# Patient Record
Sex: MALE | Race: WHITE | Employment: STUDENT | URBAN - METROPOLITAN AREA
[De-identification: names, ages, dates, MRNs, and addresses within clinical notes are randomized per-mention and may not be internally consistent; named-entity substitution may affect disease eponyms.]

---

## 2022-02-10 ENCOUNTER — APPOINTMENT (OUTPATIENT)
Dept: GENERAL RADIOLOGY | Age: 22
End: 2022-02-10
Payer: MEDICAID

## 2022-02-10 ENCOUNTER — APPOINTMENT (OUTPATIENT)
Dept: CT IMAGING | Age: 22
End: 2022-02-10
Payer: MEDICAID

## 2022-02-10 ENCOUNTER — HOSPITAL ENCOUNTER (EMERGENCY)
Age: 22
Discharge: HOME | End: 2022-02-10
Attending: EMERGENCY MEDICINE
Payer: MEDICAID

## 2022-02-10 VITALS
HEIGHT: 75 IN | DIASTOLIC BLOOD PRESSURE: 80 MMHG | HEART RATE: 68 BPM | OXYGEN SATURATION: 98 % | SYSTOLIC BLOOD PRESSURE: 126 MMHG | RESPIRATION RATE: 13 BRPM | WEIGHT: 175 LBS | TEMPERATURE: 98.3 F | BODY MASS INDEX: 21.76 KG/M2

## 2022-02-10 DIAGNOSIS — S06.0X1A CONCUSSION WITH BRIEF LOC: Primary | ICD-10-CM

## 2022-02-10 DIAGNOSIS — D67: ICD-10-CM

## 2022-02-10 LAB
ABO/RH: NORMAL
ACETAMINOPHEN LEVEL: <5 MCG/ML (ref 10–30)
ALBUMIN SERPL-MCNC: 4.9 G/DL (ref 3.5–5.2)
ALP BLD-CCNC: 59 U/L (ref 40–129)
ALT SERPL-CCNC: 14 U/L (ref 0–40)
ANGLE (CLOT STRENGTH): 56 DEGREE (ref 59–74)
ANION GAP SERPL CALCULATED.3IONS-SCNC: 14 MMOL/L (ref 7–16)
ANTIBODY SCREEN: NORMAL
APTT: 44.3 SEC (ref 24.5–35.1)
AST SERPL-CCNC: 21 U/L (ref 0–39)
B.E.: -2.9 MMOL/L (ref -3–3)
BILIRUB SERPL-MCNC: 0.6 MG/DL (ref 0–1.2)
BUN BLDV-MCNC: 24 MG/DL (ref 6–20)
CALCIUM SERPL-MCNC: 9.5 MG/DL (ref 8.6–10.2)
CHLORIDE BLD-SCNC: 103 MMOL/L (ref 98–107)
CO2: 24 MMOL/L (ref 22–29)
COHB: 0.4 % (ref 0–1.5)
COMMENT: ABNORMAL
CREAT SERPL-MCNC: 1.2 MG/DL (ref 0.7–1.2)
CRITICAL: ABNORMAL
DATE ANALYZED: ABNORMAL
DATE OF COLLECTION: ABNORMAL
EPL-TEG: 0 % (ref 0–15)
ETHANOL: <10 MG/DL (ref 0–0.08)
G-TEG: 6.8 K D/SC (ref 4.5–11)
GFR AFRICAN AMERICAN: >60
GFR NON-AFRICAN AMERICAN: >60 ML/MIN/1.73
GLUCOSE BLD-MCNC: 121 MG/DL (ref 74–99)
HCO3: 21 MMOL/L (ref 22–26)
HCT VFR BLD CALC: 41.1 % (ref 37–54)
HEMOGLOBIN: 14.9 G/DL (ref 12.5–16.5)
HHB: 0.5 % (ref 0–5)
INR BLD: 1.2
K (CLOTTING TIME): 2.7 MIN (ref 1–3)
LAB: ABNORMAL
LACTIC ACID: 4 MMOL/L (ref 0.5–2.2)
LY30 (FIBRINOLYSIS): 0 % (ref 0–8)
Lab: ABNORMAL
MA (MAX AMPLITUDE): 57.6 MM (ref 50–70)
MCH RBC QN AUTO: 31.4 PG (ref 26–35)
MCHC RBC AUTO-ENTMCNC: 36.3 % (ref 32–34.5)
MCV RBC AUTO: 86.7 FL (ref 80–99.9)
METHB: 0.4 % (ref 0–1.5)
MODE: ABNORMAL
O2 CONTENT: 20.8 ML/DL
O2 SATURATION: 99.5 % (ref 92–98.5)
O2HB: 98.7 % (ref 94–97)
OPERATOR ID: 2863
PATIENT TEMP: 37 C
PCO2: 33.9 MMHG (ref 35–45)
PDW BLD-RTO: 13.2 FL (ref 11.5–15)
PH BLOOD GAS: 7.41 (ref 7.35–7.45)
PLATELET # BLD: 288 E9/L (ref 130–450)
PMV BLD AUTO: 9.5 FL (ref 7–12)
PO2: 286.7 MMHG (ref 75–100)
POTASSIUM SERPL-SCNC: 4.08 MMOL/L (ref 3.5–5)
POTASSIUM SERPL-SCNC: 4.7 MMOL/L (ref 3.5–5)
PROTHROMBIN TIME: 13.2 SEC (ref 9.3–12.4)
R (REACTION TIME): 9.8 MIN (ref 5–10)
RBC # BLD: 4.74 E12/L (ref 3.8–5.8)
SALICYLATE, SERUM: <0.3 MG/DL (ref 0–30)
SODIUM BLD-SCNC: 141 MMOL/L (ref 132–146)
SOURCE, BLOOD GAS: ABNORMAL
THB: 14.5 G/DL (ref 11.5–16.5)
TIME ANALYZED: 1402
TOTAL PROTEIN: 7.4 G/DL (ref 6.4–8.3)
TRICYCLIC ANTIDEPRESSANTS SCREEN SERUM: NEGATIVE NG/ML
WBC # BLD: 7 E9/L (ref 4.5–11.5)

## 2022-02-10 PROCEDURE — 85384 FIBRINOGEN ACTIVITY: CPT

## 2022-02-10 PROCEDURE — 6810039000 HC L1 TRAUMA ALERT

## 2022-02-10 PROCEDURE — G0390 TRAUMA RESPONS W/HOSP CRITI: HCPCS

## 2022-02-10 PROCEDURE — 74177 CT ABD & PELVIS W/CONTRAST: CPT

## 2022-02-10 PROCEDURE — 85027 COMPLETE CBC AUTOMATED: CPT

## 2022-02-10 PROCEDURE — 80307 DRUG TEST PRSMV CHEM ANLYZR: CPT

## 2022-02-10 PROCEDURE — 99284 EMERGENCY DEPT VISIT MOD MDM: CPT | Performed by: SURGERY

## 2022-02-10 PROCEDURE — 85576 BLOOD PLATELET AGGREGATION: CPT

## 2022-02-10 PROCEDURE — 86850 RBC ANTIBODY SCREEN: CPT

## 2022-02-10 PROCEDURE — 85347 COAGULATION TIME ACTIVATED: CPT

## 2022-02-10 PROCEDURE — 70450 CT HEAD/BRAIN W/O DYE: CPT

## 2022-02-10 PROCEDURE — 82077 ASSAY SPEC XCP UR&BREATH IA: CPT

## 2022-02-10 PROCEDURE — 85730 THROMBOPLASTIN TIME PARTIAL: CPT

## 2022-02-10 PROCEDURE — 99291 CRITICAL CARE FIRST HOUR: CPT

## 2022-02-10 PROCEDURE — 80179 DRUG ASSAY SALICYLATE: CPT

## 2022-02-10 PROCEDURE — 86900 BLOOD TYPING SEROLOGIC ABO: CPT

## 2022-02-10 PROCEDURE — 72125 CT NECK SPINE W/O DYE: CPT

## 2022-02-10 PROCEDURE — 99285 EMERGENCY DEPT VISIT HI MDM: CPT

## 2022-02-10 PROCEDURE — 84132 ASSAY OF SERUM POTASSIUM: CPT

## 2022-02-10 PROCEDURE — 6360000004 HC RX CONTRAST MEDICATION: Performed by: RADIOLOGY

## 2022-02-10 PROCEDURE — 96374 THER/PROPH/DIAG INJ IV PUSH: CPT

## 2022-02-10 PROCEDURE — 6360000002 HC RX W HCPCS: Performed by: STUDENT IN AN ORGANIZED HEALTH CARE EDUCATION/TRAINING PROGRAM

## 2022-02-10 PROCEDURE — 71260 CT THORAX DX C+: CPT

## 2022-02-10 PROCEDURE — 80143 DRUG ASSAY ACETAMINOPHEN: CPT

## 2022-02-10 PROCEDURE — 71045 X-RAY EXAM CHEST 1 VIEW: CPT

## 2022-02-10 PROCEDURE — 86901 BLOOD TYPING SEROLOGIC RH(D): CPT

## 2022-02-10 PROCEDURE — 82805 BLOOD GASES W/O2 SATURATION: CPT

## 2022-02-10 PROCEDURE — 85610 PROTHROMBIN TIME: CPT

## 2022-02-10 PROCEDURE — 72170 X-RAY EXAM OF PELVIS: CPT

## 2022-02-10 PROCEDURE — 83605 ASSAY OF LACTIC ACID: CPT

## 2022-02-10 PROCEDURE — 80053 COMPREHEN METABOLIC PANEL: CPT

## 2022-02-10 RX ORDER — LEVETIRACETAM 10 MG/ML
1000 INJECTION INTRAVASCULAR ONCE
Status: COMPLETED | OUTPATIENT
Start: 2022-02-10 | End: 2022-02-10

## 2022-02-10 RX ORDER — TETANUS AND DIPHTHERIA TOXOIDS ADSORBED 2; 2 [LF]/.5ML; [LF]/.5ML
0.5 INJECTION INTRAMUSCULAR ONCE
Status: DISCONTINUED | OUTPATIENT
Start: 2022-02-10 | End: 2022-02-10 | Stop reason: HOSPADM

## 2022-02-10 RX ADMIN — LEVETIRACETAM 1000 MG: 1000 INJECTION, SOLUTION INTRAVENOUS at 15:30

## 2022-02-10 RX ADMIN — IOPAMIDOL 90 ML: 755 INJECTION, SOLUTION INTRAVENOUS at 14:27

## 2022-02-10 NOTE — ED NOTES
Airway intact, gcs 15 for stat      No injuries other than head/facial abrasions, unknown loc, had previous trauma approx 1 year ago and had seizure. j    Last po intake was breakfast        Etienne Peters RN  02/10/22 [id number]

## 2022-02-10 NOTE — H&P
TRAUMA HISTORY & PHYSICAL  Surgical Resident/Advance Practice Nurse  2/10/2022  1:56 PM    PRIMARY SURVEY    CHIEF COMPLAINT:  Trauma alert. Injury occurred just prior to arrival. Patient had a mechanical fall from standing. He states that he slipped at work. He had loss of consciousness. He denies pain currently. He has some superficial abrasions to knuckles and left eyebrow/forehead. He is GCS 15 and neurologically intact. He is not on any anticoagulation. Patient has history of hemophilia [id number]. He reports that he has had a seizure in the past, but he has not followed with anyone and does not take any medications. AIRWAY:   Airway Normal  EMS ETT Absent  Noisy respirations Absent  Retractions: Absent  Vomiting/bleeding: Absent      BREATHING:    Midaxillary breath sound left:  Normal  Midaxillary breath sound right:  Normal    Cough sound intensity:  good   FiO2:  15 L non-re breather mask     mL. CIRCULATION:   Femerol pulse intensity: Strong  Palpebral conjunctiva: Red    There were no vitals filed for this visit. There were no vitals filed for this visit.      FAST EXAM: Deferred    Central Nervous System    GCS Initial 15 minutes   Eye  Motor  Verbal 4 - Opens eyes on own  6 - Follows simple motor commands  5 - Alert and oriented 4 - Opens eyes on own  6 - Follows simple motor commands  5 - Alert and oriented     Neuromuscular blockade: No  Pupil size:  Left 4 mm    Right 4 mm  Pupil reaction: Yes    Wiggles fingers: Left Yes Right Yes  Wiggles toes: Left Yes   Right Yes    Hand grasp:   Left  Present      Right  Present  Plantar flexion: Left  Present      Right   Present    Loss of consciousness:  Yes    History Obtained From:  Patient & EMS  Private Medical Doctor: unknown    Pre-exisiting Medical History:  yes    Conditions: hemophillia    Medications: none    Allergies: none    Social History:   Tobacco use:  none  Alcohol use:  none  Illicit drug use:  no history of illicit drug use    Past Surgical History:  unknown    Anticoagulant use: None  Antiplatelet use:   None    NSAID use in last 72 hours: no  Taken PCN in past:  no  Last food/drink: this morning  Last tetanus: ordered in the ed    Family History:   No family history of anesthesia complications    Complaints:   Head:  None  Neck:   None  Chest:   None  Back:   None  Abdomen:   None  Extremities:   None  Comments: none    Review of systems:  All negative unless otherwise noted. SECONDARY SURVEY  Head/scalp: Atraumatic    Face: superficial skin tear on left eyebrow/forehead    Eyes/ears/nose: Atraumatic    Pharynx/mouth: Atraumatic    Neck: Atraumatic     Cervical spine tenderness:   Cervical collar in place at time of arrival  Pain:  none  ROM:  Not indicated     Chest wall:  Atraumatic    Heart:  Regular rate & rhythm    Abdomen: Atraumatic. Soft ND  Tenderness:  none    Pelvis: Atraumatic  Tenderness: none    Thoracolumbar spine: Atraumatic  Tenderness:  none    Genitourinary:  Atraumatic. No blood or urine noted    Rectum: Atraumatic. No blood noted. Perineum: Atraumatic. No blood or urine noted. Extremities:   Sensory normal  Motor normal    Distal Pulses  Left arm normal  Right arm normal  Left leg normal  Right leg normal    Capillary refill  Left arm normal  Right arm normal  Left leg normal  Right leg normal    Procedures in ED:  Femoral arterial puncture    In the event of Emergency Blood Transfusion:  Due to the critical condition of this patient, I request the immediate release of blood products for emergency transfusion secondary to shock. I understand the increased risks incurred by the lack of complete transfusion testing. Radiology: Chest Xray, Pelvic Xray, Ct head, Ct cervical spine, CT chest, CT abdomen    Consultations:  none    Admission/Diagnosis: mechanical fall from standing.  +LOC    Plan of Treatment:  Tert  Scans  Labs  dispo  Pain/nausea control    Plan discussed with Dr. Darrick Bustamante. Nicolas Mirza    at 2/10/2022 on 1:56 PM    Electronically signed by Cande Wu DO on 2/10/2022 at 1:56 PM

## 2022-02-10 NOTE — PROGRESS NOTES
Discussion held with Dr. Claire Huynh, the patient's hematologist from [facility]. She states that the patient needs to have 120 units/kg of Benefix given here immediately. She is willing to accept the patient as a transfer to [facility]. Transfer will be initiated.     Electronically signed by Jose L Byrd DO on 2/10/2022 at 3:15 PM

## 2022-02-10 NOTE — ED NOTES
C-collar  In place, moved to trauma cot maintaining spinal precautions      Kevin Jurado, RN  02/10/22 0237

## 2022-02-10 NOTE — ED NOTES
Patient packaged for ct with cardiac monitoring, rn and trauma residents      Yuly Cárdenas RN  02/10/22 [id number]

## 2022-02-10 NOTE — CARE COORDINATION
Social Work /Transition of Care:    Pt presented to the ED as a trauma alert via [facility] [facility] secondary to a fall hitting his head. Per report, pt has hx of hemophilia and seizures. SW spoke to pt's parents over the phone. Pt's dad reports pt was at a job site in [facility] when he fell. Pt is self employed as a bridget's assistant. Pt and parents live in the same home. Pt is normally independent with all ADLs and drives. Pt treats with Dr Xiao Drake, hematologist at [facility] in [place]. Pt's parents report they are on their way to the ED. SW to follow.

## 2022-02-10 NOTE — ED NOTES
Bed: 01  Expected date: 2/10/22  Expected time:   Means of arrival: Medical Flight ([employer])  Comments:  STAT     Keily Duran RN  02/10/22 [id number]

## 2022-02-10 NOTE — ED PROVIDER NOTES
Department of Emergency Medicine   ED  Provider Note  Admit Date/RoomTime: 2/10/2022  1:55 PM  ED Room: 01/01                  HPI:  2/10/22, Time: 2:26 PM MARYANNE Durán is a 23 y.o. male presenting to the ED as a trauma alert. Patient sustained a fall on ice while at work just prior to arrival.  He does have history of hemophilia and sees a pediatric hematologist at [facility].  He did have loss of consciousness and is amnestic to the event. It was unwitnessed. Patient reports constant sharp head pain. Please note, this patient arrived as a Trauma alert and the trauma service assumed the care of this patient on their arrival    Initial evaluation occurred with trauma services at bedside. This patients disposition will be determined by trauma services. Glascow Coma Scale at time of initial examination  Best Eye Response 4 - Opens eyes on own   Best Verbal Response 5 - Alert and oriented   Best Motor Response 6 - Follows simple motor commands   Total 15       Review of Systems:   A complete review of systems was performed and pertinent positives and negatives are stated within HPI, all other systems reviewed and are negative.              --------------------------------------------- PAST HISTORY ---------------------------------------------  Past Medical History:  has no past medical history on file. Past Surgical History:  has no past surgical history on file. Social History:      Family History: family history is not on file. Unless otherwise noted, family history is non contributory    The patients home medications have been reviewed. Allergies: Patient has no known allergies. ------------------------- NURSING NOTES AND VITALS REVIEWED ---------------------------   The nursing notes within the ED encounter and vital signs as below have been reviewed.    /80   Pulse 68   Temp 98.3 °F (36.8 °C)   Resp 13   Ht 6' 3\" (1.905 m)   Wt 175 lb (79.4 kg)   SpO2 98%   BMI 21.87 kg/m²   Oxygen Saturation Interpretation: Normal    The patients available past medical records and past encounters were reviewed. -------------------------------------------------- RESULTS -------------------------------------------------  All laboratory and radiology tests have been reviewed by myself  LABS:  Results for orders placed or performed during the hospital encounter of 02/10/22   Blood Gas, Arterial   Result Value Ref Range    Date Analyzed 20220210     Time Analyzed 1402     Source: Blood Arterial     pH, Blood Gas 7.409 7.350 - 7.450    PCO2 33.9 (L) 35.0 - 45.0 mmHg    PO2 286.7 (H) 75.0 - 100.0 mmHg    HCO3 21.0 (L) 22.0 - 26.0 mmol/L    B.E. -2.9 -3.0 - 3.0 mmol/L    O2 Sat 99.5 (H) 92.0 - 98.5 %    O2Hb 98.7 (H) 94.0 - 97.0 %    COHb 0.4 0.0 - 1.5 %    MetHb 0.4 0.0 - 1.5 %    O2 Content 20.8 mL/dL    HHb 0.5 0.0 - 5.0 %    tHb (est) 14.5 11.5 - 16.5 g/dL    Potassium 4.08 3.50 - 5.00 mmol/L    Mode NRB 15L     Comment Trauma     Date Of Collection      Time Collected      Pt Temp 37.0 C     ID [id number]     Lab [id number]     Critical(s) Notified .  No Critical Values    Comprehensive Metabolic Panel   Result Value Ref Range    Sodium 141 132 - 146 mmol/L    Potassium 4.7 3.5 - 5.0 mmol/L    Chloride 103 98 - 107 mmol/L    CO2 24 22 - 29 mmol/L    Anion Gap 14 7 - 16 mmol/L    Glucose 121 (H) 74 - 99 mg/dL    BUN 24 (H) 6 - 20 mg/dL    CREATININE 1.2 0.7 - 1.2 mg/dL    GFR Non-African American >60 >=60 mL/min/1.73    GFR African American >60     Calcium 9.5 8.6 - 10.2 mg/dL    Total Protein 7.4 6.4 - 8.3 g/dL    Albumin 4.9 3.5 - 5.2 g/dL    Total Bilirubin 0.6 0.0 - 1.2 mg/dL    Alkaline Phosphatase 59 40 - 129 U/L    ALT 14 0 - 40 U/L    AST 21 0 - 39 U/L   CBC   Result Value Ref Range    WBC 7.0 4.5 - 11.5 E9/L    RBC 4.74 3.80 - 5.80 E12/L    Hemoglobin 14.9 12.5 - 16.5 g/dL    Hematocrit 41.1 37.0 - 54.0 %    MCV 86.7 80.0 - 99.9 fL    MCH 31.4 26.0 - 35.0 pg    MCHC 36.3 (H) 32.0 - 34.5 %    RDW 13.2 11.5 - 15.0 fL    Platelets 269 178 - 745 E9/L    MPV 9.5 7.0 - 12.0 fL   Protime-INR   Result Value Ref Range    Protime 13.2 (H) 9.3 - 12.4 sec    INR 1.2    APTT   Result Value Ref Range    aPTT 44.3 (H) 24.5 - 35.1 sec   Lactic Acid, Plasma   Result Value Ref Range    Lactic Acid 4.0 (HH) 0.5 - 2.2 mmol/L   TEG lab test   Result Value Ref Range    R (Reaction Time) 9.8 5.0 - 10.0 min    K (Clotting Time) 2.7 1.0 - 3.0 min    Angle (Clot Strength) 56.0 (L) 59.0 - 74.0 degree    MA (Max Amplitude) 57.6 50.0 - 70.0 mm    G-TEG 6.8 4.5 - 11.0 K d/sc    EPL-TEG 0.0 0.0 - 15.0 %    LY30 (Fibrinolysis) 0.0 0.0 - 8.0 %   Serum Drug Screen   Result Value Ref Range    Ethanol Lvl <10 mg/dL    Acetaminophen Level <5.0 (L) 10.0 - 91.2 mcg/mL    Salicylate, Serum <0.8 0.0 - 30.0 mg/dL    TCA Scrn NEGATIVE Cutoff:300 ng/mL   TYPE AND SCREEN   Result Value Ref Range    ABO/Rh B POS     Antibody Screen NEG        RADIOLOGY:  Interpreted by Radiologist.  CT CERVICAL SPINE WO CONTRAST   Final Result   No acute osseous findings of the cervical spine. No acute traumatic findings of the abdomen or pelvis. CT HEAD WO CONTRAST   Final Result   No acute intracranial abnormality. CT ABDOMEN PELVIS W IV CONTRAST Additional Contrast? None   Final Result   No acute osseous findings of the cervical spine. No acute traumatic findings of the abdomen or pelvis. CT CHEST W CONTRAST   Final Result   No active cardiopulmonary disease. XR PELVIS (1-2 VIEWS)   Final Result   No fracture or dislocation. XR CHEST PORTABLE   Final Result   Ill-defined right upper lung field haziness, which could be technical in   nature. An infiltrate or, given the history of trauma, a lung contusion   cannot be excluded. Repeat radiograph or chest CT can be performed.                  ---------------------------------------------------PHYSICAL EXAM--------------------------------------      Primary Survey:  Airway: patient, trachea midline,   Breathing: Spontaneous, breath sounds equal bilaterally, symmetric chest rise  Circulation: 2+ femoral pulses, 2+ DP/PT pulses  Disability: GCS 15      Constitutional/General: Alert and oriented x3  Head: Normocephalic  Eyes: PERRL, EOMI, globes intact, no hyphema, no evidence of entrapment, conjunctiva pink  ENT: Oropharynx clear, handling secretions, no trismus. No dental trauma, no oral trauma  Neck: Immobilized in cervical collar. No crepitus, no lacerations, abrasions, deformities, or stepoffs. Back: No midline cervical spine tenderness. No thoracic spine tenderness. No lumbar spine tenderness. No Stepoffs, abrasions, lacerations, or deformities. Pulmonary: Lungs clear to auscultation bilaterally, no wheezes, rales, or rhonchi. Not in respiratory distress  Cardiovascular:  Regular rate and rhythm, no murmurs, gallops, or rubs. 2+ distal pulses  Chest: no chest wall tenderness, no crepitus  Abdomen: Soft, non tender, non distended, +BS, no rebound, guarding, or rigidity. No pulsatile masses appreciated  Extremities: Moves all extremities x 4. Warm and well perfused, no clubbing, cyanosis, or edema. Capillary refill <3 seconds  Skin: warm and dry without rash  Neurologic: GCS 15, CN 2-12 grossly intact, no focal deficits, symmetric strength 5/5 in the upper and lower extremities bilaterally  Psych: Normal Affect    Trauma Evaluation/Survey Conducted in accordance with ATLS Guidelines      ------------------------------ ED COURSE/MEDICAL DECISION MAKING----------------------  Medications   iopamidol (ISOVUE-370) 76 % injection 90 mL (90 mLs IntraVENous Given 2/10/22 1197)   levETIRAcetam (KEPPRA) 1000 mg/100 mL IVPB (0 mg IntraVENous Stopped 2/10/22 1011)         Medical Decision Making:    Patient presents to the ED for evaluation after an unwitnessed ground-level fall.   He did strike his head and had positive

## 2024-06-18 ENCOUNTER — APPOINTMENT (OUTPATIENT)
Dept: HEMATOLOGY/ONCOLOGY | Facility: HOSPITAL | Age: 24
End: 2024-06-18

## 2024-06-25 ENCOUNTER — APPOINTMENT (OUTPATIENT)
Dept: HEMATOLOGY/ONCOLOGY | Facility: HOSPITAL | Age: 24
End: 2024-06-25
Payer: MEDICAID

## 2024-06-25 ENCOUNTER — DOCUMENTATION (OUTPATIENT)
Dept: HEMATOLOGY/ONCOLOGY | Facility: HOSPITAL | Age: 24
End: 2024-06-25

## 2024-06-25 ENCOUNTER — OFFICE VISIT (OUTPATIENT)
Dept: HEMATOLOGY/ONCOLOGY | Facility: HOSPITAL | Age: 24
End: 2024-06-25
Payer: MEDICAID

## 2024-06-25 VITALS
OXYGEN SATURATION: 97 % | BODY MASS INDEX: 22.12 KG/M2 | WEIGHT: 177.91 LBS | SYSTOLIC BLOOD PRESSURE: 127 MMHG | TEMPERATURE: 98.2 F | RESPIRATION RATE: 18 BRPM | HEART RATE: 89 BPM | HEIGHT: 75 IN | DIASTOLIC BLOOD PRESSURE: 77 MMHG

## 2024-06-25 DIAGNOSIS — D66 HEMOPHILIA A (MULTI): Primary | ICD-10-CM

## 2024-06-25 DIAGNOSIS — D67 HEMOPHILIA B (MULTI): ICD-10-CM

## 2024-06-25 PROCEDURE — 99214 OFFICE O/P EST MOD 30 MIN: CPT | Performed by: INTERNAL MEDICINE

## 2024-06-25 ASSESSMENT — PAIN SCALES - GENERAL: PAINLEVEL: 0-NO PAIN

## 2024-06-25 NOTE — PROGRESS NOTES
"Patient ID: Roverto Gandhi is a 24 y.o. male.  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   He is on demand Benefix - has two different dose 100 units /kg for major bleed and 50 units for minor .   In the past year, he has  had to dose himself twice , one was approx 5 months ago when he fell while playing volley ball and sustained a bruise to his lt hip and another was a few weeks ago when he fell after he had a ? seizure while at work. He did  not lose consciousness, but has no recollection of events that happened, he said he was taken to ED , had a CT head and EKG and was discharged. He has a diagnosis of ? epilepsy , was on antepileptic briefly for 6 months and then taken off - is not seeing  neurology now and not any seizure meds.  He repots having nose bleeds , in spring , mainly from allergies. Does not use Amicar. He has n o upcoming procedures/ dental extractions .  He plays volleyball atleast once  a month , works in [employer] , wears a hard hat and uses protective gears.He has never had major joint bleeds .\"    Today (6/25/24):  The patient reports doing well overall and has no acute concerns or questions. He reports he last used Benefix on 4/6, after he fell onto his left knee/shin while cutting wood and caused a large bruise, which resolved. He did not have any external bleeding or lacerations and no problems with walking now. He reports infrequent nosebleeds in the spring associated with allergies, that are mild and resolve with pressure. He has no plans for surgeries or procedures, and no immediate plans for marriage/children. He is not on any medications. He works in [employer], and denies any alcohol/smoking/drug use. Denies any seizures in the past year. States he was told in the past that he was told he does not seizure medication anymore, although he isn't sure " "who.   12 point review of systems negative except as noted above     PMH : Hem B , allergies, ? seizures  PSH : None  Family History : Mom carrier , one older brother 26  with Hem, B , maternal uncle has hem B , 2 sisters 23/ 16yrs  have not been tested.  Social History : employed, works in [employer] , non smoker , drinks occ used be heavy before now oc beer . No drugs   medications and allergies reviewed in emr      VS:  /77 (BP Location: Left arm, Patient Position: Sitting, BP Cuff Size: Adult)   Pulse 89   Temp 36.8 °C (98.2 °F) (Temporal)   Resp 18   Ht 1.916 m (6' 3.43\")   Wt 80.7 kg (177 lb 14.6 oz)   SpO2 97%   BMI 21.98 kg/m²       Physical Exam  Constitutional:       General: He is not in acute distress.     Appearance: Normal appearance.   HENT:      Head: Normocephalic and atraumatic.      Mouth/Throat:      Mouth: Mucous membranes are moist.   Eyes:      Pupils: Pupils are equal, round, and reactive to light.   Cardiovascular:      Rate and Rhythm: Normal rate and regular rhythm.      Heart sounds: No murmur heard.     No friction rub.   Pulmonary:      Effort: Pulmonary effort is normal. No respiratory distress.      Breath sounds: Normal breath sounds. No wheezing, rhonchi or rales.   Abdominal:      General: There is no distension.      Palpations: Abdomen is soft. There is no mass.      Tenderness: There is no abdominal tenderness. There is no guarding or rebound.      Hernia: No hernia is present.   Musculoskeletal:         General: No swelling or deformity.   Skin:     General: Skin is warm and dry.      Findings: No rash.   Neurological:      General: No focal deficit present.      Mental Status: He is alert and oriented to person, place, and time.   Psychiatric:         Mood and Affect: Mood normal.         Behavior: Behavior normal.         Assessment/Plan     Roverto Gandhi is a 24-year-old man with moderate hemophilia B on on-demand Benefix.     PLAN   - Continue on demand Benefix - " prefer to use one standard dose of 100 units / kg for any bleeding episode .   - Discussed need to take prophy dose prior to any contact sports with goal to prevent any bleeding rather than dose after injury.  - Wear medical alert bracelet .   - Avoid NSAIDs and aspirin .  - Extensively counseled to maintain care with a primary care physician.   - Knows to call if there is any planned surgery/procedure .  - RTC in one year .     Patient seen and discussed with Dr. Kim.    Jose Jhaveri MD

## 2024-06-25 NOTE — PROGRESS NOTES
Patient in for his annual viisit with Dr. Kim and the [facility] team. Patient has insurance, no PCP yet. Patient reports overall health has been good and bleeding disorder well managed. Had a fall a few months ago, did not call center but self infused and resolved bleed, see medical notes.Patient reports mental & emotional health are good, no depressive episodes no attempts or discussion of suicide, no attempts at self harm or attempting to hurt others, no manic or depressive mood swings, no rage or uncontrolled anger or outbursts, doesn't isolate, engages well with family, peers and community. Reports seeing himself as pretty normal as it relates to his mental health. Reports managing usual and customary stressors well. 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 Reports eating and sleeping well. Up at work between 4a-5p, finishing 4p-5p daily. Patient also does [employer] work to supplement to his income. Thinking about working the farm only eventually. 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 Eventually patient is hoping the farm will generate enough income to support him. Currently living with parents and sibs. Patient reports hunting and volleyball as down time recreation. Reports all is well, no threats or hazards in the home, basic needs are being met, reports no need for supportive service referrals at this time. SW to follow up as needed.       KAY Chino  [facility]

## 2024-06-26 NOTE — PROGRESS NOTES
[facility] PT Consult    Patient: Roverto Gandhi  MRN: [id number]  06/26/24    Assessment   Roverto is a 24 y.o. with PMHx Hemophilia B who was seen by Physical Therapy in clinic on 5/25/2024. Pt is overall doing well. 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 Pt reports that he has had one bleed in the past year. States that he tripped and fell and had L knee and L shin bleed on April 16, 2024. States when he had bleed that it hurt to walk, knee ROM was painful, had some swelling and bruising medial L knee. Pt states when he injured himself it only hurt when he was moving his knee and walking, not painful at rest. Pt states he took x1 dose after injury and injury resolved in ~1-2 days. Pt states he is not having any problems with L LE now.    Upon assessment, as noted below, pt has normal knee ROM grossly R/L with mild crepitus noted R/L. Pt demonstrating 5/5 knee flex strength upon MMT R/L and 5/5 knee ext strength upon MMT R/L. No swelling, pain, warmth or tenderness to palpation noted at either knee. Gait pattern WNL with no deviations noted.      Plan/Recommendations:  No further [facility] PT needs at this time. Physical Therapy to follow during clinic visits.      Subjective   Pt states that he works in [employer]. Pt reports that he has had one bleed in the past year. States that he tripped and fell and had L knee and L shin bleed on April 16, 2024. States when he had bleed that it hurt to walk, knee ROM was painful, had some swelling and bruising medial L knee. Pt states when he injured himself it only hurt when he was moving his knee and walking, not painful at rest. Pt states he took x1 dose after injury and injury resolved in ~1-2 days. Pt states he is not having any problems with L LE now.      Past Medical History: No past medical history on file.    Past Surgical History: No past surgical history on file.    Interim Bleeding History:  Recent Bleeds:   April 2024: trip and fall resulting in L knee and L  shin bleed.       Objective   Joint Assessment:  Left Elbow: WFL  Right Elbow: WFL  Left Knee: Mild Crepitus Noted  Right Knee: Mild Crepitus Noted  Left Ankle: WFL  Right Ankle: WFL    Range of Motion:   Elbow Extention (0): Left WNL and Right WNL  Elbow Flexion (0-145): Left WNL and Right WNL  Knee Flexion (0-135): Left WNL and Right WNL  Knee Extension (0): Left WNL and Right WNL  Ankle Plantarflexion (0-50): Left WNL and Right WNL  Ankle Dorsiflexion: Left WNL and Right WNL  Ankle Inversion (0-30): Left WNL and Right WNL  Ankle Eversion (0-20): Left WNL and Right WNL    Manual Muscle Tests:  Left Elbow Flexion: 5/5   Right Elbow Flexion: 5/5   Left Elbow Extension: 5/5   Right Elbow Extension: 5/5   Left Knee Flexion: 5/5   Right Knee Flexion: 5/5   Left Knee Extension: 5/5   Right Knee Extension: 5/5   Left Ankle Dorsiflexion: 5/5   Right Ankle Dorsiflexion: 5/5     Mobility:  Gait: WNL, no deviations noted    Ruthie Baker, PT

## 2024-06-28 NOTE — PROGRESS NOTES
[facility] Nursing note  6/25/24  Comprehensive Annual Visit  [facility] Nursing note    Patient is 25 yo male with moderate hemophilia B 3.5% baseline. Patient is transitioning care from [facility]. Patient was seen today by physician, nursing staff, physical therapist, and . Patient currently takes Benefix on demand 100 units/kg for major bleeds and 50 units/kg for minor bleeds. Patient [facility] factor [facility]. Patient reports only having one bleed in past year on 4/6 after falling on his left knee/shin at work. Patient received one dose of factor and bleeding resolved. Patient states he still has factor at home. He states he has occasional nose bleeds related to allergies and denies any gum bleeding.     Emergency medical card was updated, reviewed, and given to the patient. Patient choice policy was signed. Patient will call about any upcoming procedures or dental extractions. Patient expressed interest in coming to our [facility]reach clinic next year so no annual appointment was scheduled at this time.

## 2025-01-22 DIAGNOSIS — D67 HEMOPHILIA B (MULTI): Primary | ICD-10-CM

## 2025-01-28 ENCOUNTER — HOME INFUSION (OUTPATIENT)
Dept: INFUSION THERAPY | Age: 25
End: 2025-01-28
Payer: MEDICAID

## 2025-01-28 NOTE — PROGRESS NOTES
Received orders for Benefix 8070 units (+/- 10%) q24h prn. Orders processed by insurance Team and is non-340B. Auth obtained.     Per previous conversation with pt, delivery requested for 1/30 between 6-9 pm with peripheral supplies (no flushes).    Medication ordered for delivery before 1/30    Pharmacy to send the following 1/30 straight:  6x Benefix 3040 units  3x Benefix 2080 units  DOS 1/31-2/2     Pt to follow up with pharmacy for future needs

## 2025-01-28 NOTE — PROGRESS NOTES
Received orders for Benefix 8070 units (+/- 10%) q24h prn. Orders processed by insurance Team and is non-340B. Auth obtained.    [facility] spoke with patient and delivery requested at this time. Patient agreeable to deliver 01/30/25 between 6-9 with peripheral supplies (no flushes). Address confirmed and all questions answered     made aware of need.    Follow up 01/30/25 with fill of 3 doses Benefix for straight delivery.

## 2025-01-29 ENCOUNTER — DOCUMENTATION (OUTPATIENT)
Dept: INFUSION THERAPY | Age: 25
End: 2025-01-29
Payer: MEDICAID

## 2025-01-29 NOTE — PROGRESS NOTES
Ticket made from pharmacist note:    Returning patient. Sending tomorrow between 6-9 pm with the  to call first. Sending to address as verified by [facility].     Sending standard supplies and paperwork for 3 doses of  Benefix delivered peripherally  via IV Push  including syringes for pooling dose.     Patient previously spoke with a pharmacist.

## 2025-06-19 ENCOUNTER — EXTERNAL FACILITY CLINIC VISIT (OUTPATIENT)
Dept: HEMATOLOGY/ONCOLOGY | Facility: EXTERNAL LOCATION | Age: 25
End: 2025-06-19
Payer: MEDICAID

## 2025-06-20 ENCOUNTER — TELEPHONE (OUTPATIENT)
Dept: HEMATOLOGY/ONCOLOGY | Facility: HOSPITAL | Age: 25
End: 2025-06-20
Payer: MEDICAID

## 2025-06-20 NOTE — TELEPHONE ENCOUNTER
RN communicated with the [facility] nurses about this patient. They said that he was to come to the [facility] that was yesterday 6/19. RN called patient and offered him to come to the next [facility] clinic or did he want to schedule with Dr. Cleaning and he preferred to come to the next [facility] clinic in the fall. Patient said that he was planning on going to the [facility] clinic on 6/19 but he called and talked to a nurse who told him that there wasn't a [facility] clinic in [place]. RN explained that a [facility] nurse will call him to clarify where and when the next [facility] clinic is. Patient voiced understanding. Email sent to the [facility] nurses.